# Patient Record
(demographics unavailable — no encounter records)

---

## 2025-01-02 NOTE — HISTORY OF PRESENT ILLNESS
[de-identified] : Patient presents for RT shoulder pain. Patient went to Dr. Silverio's office for x-rays. Patient states yesterday while she was reaching over while shopping and felt a sharp pain in the front of her shoulder. Patient states she was having pain prior but got aggravated yesterday. Patient is RHD.

## 2025-01-02 NOTE — PHYSICAL EXAM
[Sitting] : sitting [Severe] : severe [3 ___] : forward flexion 3[unfilled]/5 [3___] : internal rotation 3[unfilled]/5 [Right] : right shoulder [Outside films reviewed] : Outside films reviewed [Degenerative change] : Degenerative change [Type 2 acromion] : Type 2 acromion [AC Joint Arthrosis] : AC Joint Arthrosis [Glenohumeral arthritis] : Glenohumeral arthritis [] : no atrophy [de-identified] : pain [[email or web address]] : active forward flexion 30 degrees [de-identified] : active abduction 5 degrees [[email or web address]] : internal rotation lateral hip [de-identified] : external rotation 15 degrees

## 2025-01-02 NOTE — DISCUSSION/SUMMARY
[de-identified] : I reviewed all diagnostic and physical findings, the anatomy and pathology were discussed and the patient understands. All questions were answered and the patient left pleased. After discussion of diagnosis and treatment options, this patient has elected to treat non-operatively with exercises, medications, physical therapy and activity modification. The patient was advised to call for an appointment to follow up after MRI evaluation.  sling

## 2025-01-28 NOTE — DISCUSSION/SUMMARY
[de-identified] : I reviewed patient's MRI of the shoulder and discussed her condition and treatment options.  Nonoperative treatments include taking NSAIDs, corticosteroid injections, and physical therapy.  Operative interventions include arthroscopic debridement and repair and arthroplasty.  Patient elected to proceed with course of home therapy and NSAIDs and will follow up in 8 weeks for reassessment with Dr. Mota.  Patient voiced understanding and agreement with the plan.

## 2025-01-28 NOTE — PHYSICAL EXAM
[Sitting] : sitting [3 ___] : forward flexion 3[unfilled]/5 [3___] : internal rotation 3[unfilled]/5 [Right] : right shoulder [Outside films reviewed] : Outside films reviewed [Degenerative change] : Degenerative change [Type 2 acromion] : Type 2 acromion [AC Joint Arthrosis] : AC Joint Arthrosis [Glenohumeral arthritis] : Glenohumeral arthritis [] : no AC joint tenderness [de-identified] : pain [TWNoteComboBox7] : active forward flexion 160 degrees [de-identified] : active abduction 70 degrees [[email or web address]] : internal rotation lateral hip [de-identified] : external rotation 50 degrees

## 2025-01-28 NOTE — DATA REVIEWED
[MRI] : MRI [Right] : of the right [Shoulder] : shoulder [Report was reviewed and noted in the chart] : The report was reviewed and noted in the chart [I reviewed the films/CD and agree] : I reviewed the films/CD and agree [FreeTextEntry1] : Near complete tear of the distal supraspinatus tenson with punctuate full-thickness tear of the distal infraspinatus tendon. Partial-thickness tear of the long head of the biceps tendon. Muscular atrophy, full-thickness tear of the anterior glenoid labrum. Degenerative changes. Full-thickness tear of the coracoacromial ligament

## 2025-01-28 NOTE — HISTORY OF PRESENT ILLNESS
[de-identified] : Patient presents for RT shoulder MRI results, being seen today in Dr. Mota's absence. Patient had cortisone injection last visit and was given a sling.  Since the injection, she reports feeling significant improvement and has better range of motion now.